# Patient Record
Sex: FEMALE | Race: WHITE | NOT HISPANIC OR LATINO | URBAN - METROPOLITAN AREA
[De-identification: names, ages, dates, MRNs, and addresses within clinical notes are randomized per-mention and may not be internally consistent; named-entity substitution may affect disease eponyms.]

---

## 2019-10-29 ENCOUNTER — EMERGENCY (EMERGENCY)
Facility: HOSPITAL | Age: 64
LOS: 1 days | Discharge: HOME | End: 2019-10-29
Admitting: EMERGENCY MEDICINE
Payer: COMMERCIAL

## 2019-10-29 VITALS
DIASTOLIC BLOOD PRESSURE: 66 MMHG | WEIGHT: 125 LBS | HEART RATE: 63 BPM | OXYGEN SATURATION: 96 % | RESPIRATION RATE: 15 BRPM | HEIGHT: 61 IN | TEMPERATURE: 98 F | SYSTOLIC BLOOD PRESSURE: 143 MMHG

## 2019-10-29 PROCEDURE — 12011 RPR F/E/E/N/L/M 2.5 CM/<: CPT

## 2019-10-29 PROCEDURE — 99283 EMERGENCY DEPT VISIT LOW MDM: CPT | Mod: 25

## 2019-10-29 RX ORDER — TETANUS TOXOID, REDUCED DIPHTHERIA TOXOID AND ACELLULAR PERTUSSIS VACCINE, ADSORBED 5; 2.5; 8; 8; 2.5 [IU]/.5ML; [IU]/.5ML; UG/.5ML; UG/.5ML; UG/.5ML
0.5 SUSPENSION INTRAMUSCULAR ONCE
Refills: 0 | Status: COMPLETED | OUTPATIENT
Start: 2019-10-29 | End: 2019-10-29

## 2019-10-29 RX ORDER — OXYCODONE AND ACETAMINOPHEN 5; 325 MG/1; MG/1
1 TABLET ORAL ONCE
Refills: 0 | Status: DISCONTINUED | OUTPATIENT
Start: 2019-10-29 | End: 2019-10-29

## 2019-10-29 RX ADMIN — OXYCODONE AND ACETAMINOPHEN 1 TABLET(S): 5; 325 TABLET ORAL at 19:45

## 2019-10-29 RX ADMIN — TETANUS TOXOID, REDUCED DIPHTHERIA TOXOID AND ACELLULAR PERTUSSIS VACCINE, ADSORBED 0.5 MILLILITER(S): 5; 2.5; 8; 8; 2.5 SUSPENSION INTRAMUSCULAR at 19:45

## 2019-10-29 NOTE — ED PROVIDER NOTE - PATIENT PORTAL LINK FT
You can access the FollowMyHealth Patient Portal offered by [facility] by registering at the following website: [email or web address]. By joining [employer]’s FollowMyHealth portal, you will also be able to view your health information using other applications (apps) compatible with our system.

## 2019-10-29 NOTE — ED PROVIDER NOTE - CLINICAL SUMMARY MEDICAL DECISION MAKING FREE TEXT BOX
Patient presents with laceration to the bridge of the nose and abrasions s/p fall, no LOC. Will update tetanus and suture wound. No clinical indication for head CT at this time. Patient presents with laceration to the bridge of the nose and abrasions s/p fall, no LOC. Laceration was successfully sutured without any complications. Will update tetanus. No clinical indication for head CT at this time. 63 y/o female presents with laceration across the nose s/p fall. Patient is not on anticoagulants, no neurologic symptoms, no facial tenderness on exam. Laceration repaired as per procedure note, tetanus updated, will discharge. 63 y/o female presents with laceration across the nose s/p fall. Patient is not on anticoagulants, no neurologic symptoms, no facial tenderness on exam. offered CT which pt declined. Laceration repaired as per procedure note, tetanus updated, will discharge.
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
## 2019-10-29 NOTE — ED PROVIDER NOTE - ENMT, MLM
Airway patent. Mouth with normal mucosa. Throat has no vesicles, no oropharyngeal exudates and uvula is midline. Hematoma and abrasion to the right eyebrow. 1cm laceration over the bridge of the nose, no tenderness to the nasal bones. Abrasion to the right upper lip.

## 2019-10-29 NOTE — ED PROVIDER NOTE - NS ED ATTENDING NAME FT
MD Notification    Notified Person: MD    Notified Person Name: Peterson     Notification Date/Time: 10/3/19 2230    Notification Interaction: Phone     Purpose of Notification: Valium requested    Orders Received: PTA valium order resumed    Comments:       EMERALD

## 2019-10-29 NOTE — ED PROVIDER NOTE - NSFOLLOWUPINSTRUCTIONS_ED_ALL_ED_FT
KEEP WOUND CLEAN AND DRY.     OKAY TO WASH FACE NORMALLY STARTING TOMORROW NIGHT BUT DO NOT SUBMERGE WITH SWIMMING OR BATHS.     APPLY ANTIBIOTIC OINTMENT SEVERAL TIMES DAILY.     TAKE ANTIBIOTIC AS PRESCRIBED FOR 4 DAYS TO PREVENT INFECTION GIVEN WOUND DEPTH.     OKAY TO TAKE TYLENOL 650MG EVERY 6 HOURS AS NEEDED FOR DISCOMFORT.     RETURN TO SUTURE REMOVAL IN 7 DAYS. RETURN SOONER FOR ANY FEVER, CHILLS, REDNESS, PUS DRAINAGE, HEADACHE, VISION CHANGES, NAUSEA, VOMITING, NUMBNESS, WEAKNESS, ANY OTHER CONCERNS.

## 2019-10-29 NOTE — ED PROCEDURE NOTE - PROCEDURE ADDITIONAL DETAILS
4 sutures in total: 1 internal suture, 3 external 5-0 nylon sutures. 4 sutures in total: 1 internal suture vycril, 3 external 5-0 nylon sutures.

## 2019-11-04 DIAGNOSIS — S00.81XA ABRASION OF OTHER PART OF HEAD, INITIAL ENCOUNTER: ICD-10-CM

## 2019-11-04 DIAGNOSIS — S00.11XA CONTUSION OF RIGHT EYELID AND PERIOCULAR AREA, INITIAL ENCOUNTER: ICD-10-CM
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
## 2019-11-04 DIAGNOSIS — S01.21XA LACERATION WITHOUT FOREIGN BODY OF NOSE, INITIAL ENCOUNTER: ICD-10-CM

## 2019-11-04 DIAGNOSIS — S80.811A ABRASION, RIGHT LOWER LEG, INITIAL ENCOUNTER: ICD-10-CM
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
## 2019-11-04 DIAGNOSIS — Z23 ENCOUNTER FOR IMMUNIZATION: ICD-10-CM

## 2019-11-04 DIAGNOSIS — S80.812A ABRASION, LEFT LOWER LEG, INITIAL ENCOUNTER: ICD-10-CM

## 2020-02-26 NOTE — ED ADULT TRIAGE NOTE - NSWEIGHTCALCTOOLDRUG_GEN_A_CORE
Jessika/Leila -     Please call patient and advise iron studies revealed iron deficiency anemia. I ordered Injectafer x 2 weekly doses.       Electronically signed by MARCELLA Mar, 02/26/20, 5:47 PM.    used

## 2022-05-25 NOTE — ED PROVIDER NOTE - SKIN, MLM
Detail Level: Simple 1cm abrasion to the left shin and 2cm abrasion to the right shin, with some surrounding early bruising.

## 2023-02-02 NOTE — ED PROVIDER NOTE - CROS ED CONS ALL NEG
What Type Of Note Output Would You Prefer (Optional)?: Bullet Format
Is The Patient Presenting As Previously Scheduled?: Yes
How Severe Is Your Rash?: mild
Is This A New Presentation, Or A Follow-Up?: Rash
- - -